# Patient Record
Sex: MALE | Race: WHITE | Employment: FULL TIME | URBAN - METROPOLITAN AREA
[De-identification: names, ages, dates, MRNs, and addresses within clinical notes are randomized per-mention and may not be internally consistent; named-entity substitution may affect disease eponyms.]

---

## 2018-06-06 VITALS
WEIGHT: 243 LBS | RESPIRATION RATE: 16 BRPM | SYSTOLIC BLOOD PRESSURE: 120 MMHG | DIASTOLIC BLOOD PRESSURE: 70 MMHG | BODY MASS INDEX: 32.2 KG/M2 | HEART RATE: 72 BPM | TEMPERATURE: 98.1 F | HEIGHT: 73 IN

## 2018-07-05 ENCOUNTER — TELEPHONE (OUTPATIENT)
Dept: PRIMARY CARE CLINIC | Age: 18
End: 2018-07-05

## 2018-07-05 NOTE — TELEPHONE ENCOUNTER
Chest cold, has not gone since late May, no appointments showing today; And CNP at [facility] for overflow is out this week.   Please assist patient

## 2019-02-19 LAB
A/G RATIO: 2 (ref 1.1–2.2)
ALBUMIN SERPL-MCNC: 4.9 G/DL (ref 3.4–5)
ALP BLD-CCNC: 46 U/L (ref 40–129)
ALT SERPL-CCNC: 24 U/L (ref 10–40)
ANION GAP SERPL CALCULATED.3IONS-SCNC: 12 MMOL/L (ref 3–16)
AST SERPL-CCNC: 17 U/L (ref 15–37)
BASOPHILS ABSOLUTE: 0 K/UL (ref 0–0.2)
BASOPHILS RELATIVE PERCENT: 0.7 %
BILIRUB SERPL-MCNC: 0.9 MG/DL (ref 0–1)
BUN BLDV-MCNC: 16 MG/DL (ref 7–20)
CALCIUM SERPL-MCNC: 9.9 MG/DL (ref 8.3–10.6)
CHLORIDE BLD-SCNC: 102 MMOL/L (ref 99–110)
CHOLESTEROL, FASTING: 142 MG/DL (ref 0–199)
CO2: 26 MMOL/L (ref 21–32)
CREAT SERPL-MCNC: 1 MG/DL (ref 0.9–1.3)
EOSINOPHILS ABSOLUTE: 0.1 K/UL (ref 0–0.6)
EOSINOPHILS RELATIVE PERCENT: 2.3 %
GFR AFRICAN AMERICAN: >60
GFR NON-AFRICAN AMERICAN: >60
GLOBULIN: 2.4 G/DL
GLUCOSE FASTING: 92 MG/DL (ref 70–99)
HCT VFR BLD CALC: 48.8 % (ref 40.5–52.5)
HDLC SERPL-MCNC: 64 MG/DL (ref 40–60)
HEMOGLOBIN: 16.5 G/DL (ref 13.5–17.5)
LDL CHOLESTEROL CALCULATED: 69 MG/DL
LYMPHOCYTES ABSOLUTE: 1.4 K/UL (ref 1–5.1)
LYMPHOCYTES RELATIVE PERCENT: 24.1 %
MCH RBC QN AUTO: 29 PG (ref 26–34)
MCHC RBC AUTO-ENTMCNC: 33.8 G/DL (ref 31–36)
MCV RBC AUTO: 85.6 FL (ref 80–100)
MONOCYTES ABSOLUTE: 0.4 K/UL (ref 0–1.3)
MONOCYTES RELATIVE PERCENT: 7.1 %
NEUTROPHILS ABSOLUTE: 3.7 K/UL (ref 1.7–7.7)
NEUTROPHILS RELATIVE PERCENT: 65.8 %
PDW BLD-RTO: 13.4 % (ref 12.4–15.4)
PLATELET # BLD: 171 K/UL (ref 135–450)
PMV BLD AUTO: 8.4 FL (ref 5–10.5)
POTASSIUM SERPL-SCNC: 4.5 MMOL/L (ref 3.5–5.1)
RBC # BLD: 5.69 M/UL (ref 4.2–5.9)
SODIUM BLD-SCNC: 140 MMOL/L (ref 136–145)
T4 FREE: 1.1 NG/DL (ref 0.9–1.8)
TOTAL PROTEIN: 7.3 G/DL (ref 6.4–8.2)
TRIGLYCERIDE, FASTING: 44 MG/DL (ref 0–150)
TSH SERPL DL<=0.05 MIU/L-ACNC: 0.99 UIU/ML (ref 0.27–4.2)
VLDLC SERPL CALC-MCNC: 9 MG/DL
WBC # BLD: 5.7 K/UL (ref 4–11)

## 2019-02-21 ENCOUNTER — OFFICE VISIT (OUTPATIENT)
Dept: PRIMARY CARE CLINIC | Age: 19
End: 2019-02-21
Payer: COMMERCIAL

## 2019-02-21 VITALS
TEMPERATURE: 98.3 F | HEIGHT: 72 IN | DIASTOLIC BLOOD PRESSURE: 74 MMHG | SYSTOLIC BLOOD PRESSURE: 118 MMHG | BODY MASS INDEX: 35.33 KG/M2 | WEIGHT: 260.8 LBS | OXYGEN SATURATION: 99 % | HEART RATE: 83 BPM

## 2019-02-21 DIAGNOSIS — E66.09 CLASS 2 OBESITY DUE TO EXCESS CALORIES WITHOUT SERIOUS COMORBIDITY WITH BODY MASS INDEX (BMI) OF 35.0 TO 35.9 IN ADULT: ICD-10-CM

## 2019-02-21 DIAGNOSIS — Z00.00 ROUTINE GENERAL MEDICAL EXAMINATION AT A HEALTH CARE FACILITY: Primary | ICD-10-CM

## 2019-02-21 LAB
BILIRUBIN, POC: NORMAL
BLOOD URINE, POC: NORMAL
CLARITY, POC: CLEAR
COLOR, POC: YELLOW
GLUCOSE URINE, POC: NORMAL
KETONES, POC: NORMAL
LEUKOCYTE EST, POC: NORMAL
NITRITE, POC: NORMAL
PH, POC: 7
PROTEIN, POC: NORMAL
SPECIFIC GRAVITY, POC: 1.01
UROBILINOGEN, POC: NORMAL

## 2019-02-21 PROCEDURE — G8484 FLU IMMUNIZE NO ADMIN: HCPCS | Performed by: NURSE PRACTITIONER

## 2019-02-21 PROCEDURE — 81002 URINALYSIS NONAUTO W/O SCOPE: CPT | Performed by: NURSE PRACTITIONER

## 2019-02-21 PROCEDURE — 92551 PURE TONE HEARING TEST AIR: CPT | Performed by: NURSE PRACTITIONER

## 2019-02-21 PROCEDURE — 99395 PREV VISIT EST AGE 18-39: CPT | Performed by: NURSE PRACTITIONER

## 2019-02-21 ASSESSMENT — ENCOUNTER SYMPTOMS
CHEST TIGHTNESS: 0
SHORTNESS OF BREATH: 0
NAUSEA: 0
VOMITING: 0
COUGH: 0
TROUBLE SWALLOWING: 0
DIARRHEA: 0
ABDOMINAL PAIN: 0
CONSTIPATION: 0

## 2019-02-21 ASSESSMENT — VISUAL ACUITY
OD_CC: 20/20
OS_CC: 20/20

## 2022-01-03 ENCOUNTER — OFFICE VISIT (OUTPATIENT)
Dept: ENT CLINIC | Age: 22
End: 2022-01-03
Payer: COMMERCIAL

## 2022-01-03 VITALS
HEIGHT: 72 IN | DIASTOLIC BLOOD PRESSURE: 84 MMHG | TEMPERATURE: 97.5 F | WEIGHT: 229 LBS | BODY MASS INDEX: 31.02 KG/M2 | SYSTOLIC BLOOD PRESSURE: 122 MMHG

## 2022-01-03 DIAGNOSIS — R09.81 NASAL CONGESTION: Primary | ICD-10-CM

## 2022-01-03 DIAGNOSIS — H93.8X3 EAR FULLNESS, BILATERAL: ICD-10-CM

## 2022-01-03 DIAGNOSIS — J34.2 NASAL SEPTAL DEVIATION: ICD-10-CM

## 2022-01-03 DIAGNOSIS — F42.9 OBSESSIVE-COMPULSIVE DISORDER, UNSPECIFIED TYPE: ICD-10-CM

## 2022-01-03 PROCEDURE — 99203 OFFICE O/P NEW LOW 30 MIN: CPT | Performed by: OTOLARYNGOLOGY

## 2022-01-03 RX ORDER — SODIUM CHLORIDE/SODIUM BICARB
1 PACKET (EA) NASAL 2 TIMES DAILY
Qty: 200 EACH | Refills: 1 | Status: SHIPPED | OUTPATIENT
Start: 2022-01-03

## 2022-01-03 RX ORDER — FLUTICASONE PROPIONATE 50 MCG
1 SPRAY, SUSPENSION (ML) NASAL 2 TIMES DAILY
Qty: 16 G | Refills: 2 | Status: SHIPPED | OUTPATIENT
Start: 2022-01-03

## 2022-01-03 RX ORDER — HYDROXYZINE HYDROCHLORIDE 10 MG/1
10 TABLET, FILM COATED ORAL
COMMUNITY
Start: 2021-11-26

## 2022-01-03 NOTE — PROGRESS NOTES
[facility] PATIENT CONSULT NOTE      Patient Name: Geneva Johnson  Medical Record Number:  [id number]  Primary Care Physician:  None Provider    Chief Concern     Chief Complaint   Patient presents with    New Patient     Patient states his ears \"feel cogged\", and he feels off balanced     Assessment and Plan      Diagnosis Orders   1. Nasal congestion  Hypertonic Nasal Wash (SINUS RINSE REFILL) PACK    fluticasone (FLONASE) 50 MCG/ACT nasal spray   2. Ear fullness, bilateral     3. Nasal septal deviation     4. Obsessive-compulsive disorder, unspecified type       19year old male with nasal fullness, ear pressure/hearing loss and slight dysequilibrium (states vertigo, no nystagmus appreciated) - type A tympanograms bilaterally with good lateralization of the eardrums on valsalva, normal tuning fork examination. Nose with moderate inflammation and left mid-septal deviation. Will start flonase/saline rinses, recommend Valsalva, RTC in 6 weeks. Return in about 6 weeks (around 2/14/2022). History of Present Illness     [facility] Wendy Machado is an 19 y.o. male with concern for hearing loss, imbalance and clogged ears - ongoing since late 11/2021 after an upper respiratory infection. Was diagnosed with an ear infection, given Augmentin - states significant pain in the ears at that time which resolved. Feels like \"my head's in the clouds\". Some \"very small\" farhat vertigo/room spinning, constant without nausea/emesis. 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 No history of ear surgery. Nonsmoker, no family history of hearing loss. Concern for increased anxiety/panic attacks - has OCD sees Dr. Karsten Fitzpatrick and [facility] in [place], [place] - has discussed this with her.      Past Medical History     Past Medical History:   Diagnosis Date    Depression     Hearing loss     Obesity        Past Surgical History No past surgical history on file. Family History     Family History   Problem Relation Age of Onset    Colon Cancer Maternal Grandmother     Colon Cancer Maternal Grandfather     Colon Cancer Paternal Grandmother     Colon Cancer Paternal Grandfather     Diabetes Paternal Grandfather        Social History     Social History     Tobacco Use    Smoking status: Never Smoker    Smokeless tobacco: Never Used   Substance Use Topics    Alcohol use: No    Drug use: No        Allergies     Allergies   Allergen Reactions    Robitussin Multi-Symptom Max [Phenylephrine-Dm-Gg] Anaphylaxis       Medications     Current Outpatient Medications   Medication Sig Dispense Refill    hydrOXYzine (ATARAX) 10 MG tablet Take 10 mg by mouth      Hypertonic Nasal Wash (SINUS RINSE REFILL) PACK 1 packet by Nasal route 2 times daily 200 each 1    fluticasone (FLONASE) 50 MCG/ACT nasal spray 1 spray by Each Nostril route 2 times daily 16 g 2     No current facility-administered medications for this visit.        Review of Systems     REVIEW OF SYSTEMS  The following systems were reviewed and revealed the following in addition to any already discussed in the HPI:    CONSTITUTIONAL: NO weight loss, no fever, no night sweats, no chills  EYES: no vision changes, no blurry vision  EARS: +changes in hearing, no otalgia  NOSE: no epistaxis, no rhinorrhea  RESPIRATORY: NO difficulty breathing, no shortness of breath  CV: no chest pain, NO peripheral vascular disease  HEME: No coagulation disorder, NO bleeding disorder  NEURO: No TIA or stroke-like symptoms  SKIN: No new rashes in the head and neck, no recent skin cancers  MOUTH: No new ulcers, no recent teeth infections  GASTROINTESTINAL: No diarrhea, stomach pain  PSYCH: +anxiety, no depression    PhysicalExam     Vitals:    01/03/22 0926   BP: 122/84   Site: Left Upper Arm   Position: Sitting   Temp: 97.5 °F (36.4 °C)   Weight: 229 lb (103.9 kg)   Height: 6' (1.829 m) PHYSICAL EXAM  /84 (Site: Left Upper Arm, Position: Sitting)   Temp 97.5 °F (36.4 °C)   Ht 6' (1.829 m)   Wt 229 lb (103.9 kg)   BMI 31.06 kg/m²     GENERAL: No Acute Distress, Alert and Oriented, No hoarseness  EYES: EOMI, Anti-icteric  NOSE: On anterior rhinoscopy there is no epistaxis, nasal mucosa within normal limits, no purulent drainage  EARS: Normal external appearance; on portable otomicroscopy:  -Right ear: External auditory canal without stenosis, tympanic membrane clear, no middle ear effusions or retractions  -Left ear: External auditory canal without stenosis, tympanic membrane clear, no middle ear effusions or retractions  -Tuning fork testing: With a 512-Hz tuning fork the Leija is midline, The Rinne on the right ear the is air>bone conduction, on the left ear air>bone conduction  -Portable tympanometry with type A bilaterally (-28/-35dKpa AD/AS)  FACE: 1/6 House-Brackmann Scale, symmetric, sensation equal bilaterally  ORAL CAVITY: No masses or lesions palpated, uvula is midline, moist mucous membranes, 1+ tonsils, good dentition  NECK: Normal range of motion, no thyromegaly, trachea is midline, no lymphadenopathy, no neck masses, no crepitus  CHEST: Normal respiratory effort, no retractions, breathing comfortably  SKIN: No rashes, normal appearing skin, no evidence of skin lesions/tumors  NEURO: CN 2, 3, 4, 5, 6, 7, 11, 12 intact bilaterally     Data Review       Procedure     None    Taran Levy MD  [facility]  Department of Otolaryngology/Head and Neck Surgery  1/3/22    I have performed a head and neck physical exam personally or was physically present during the key or critical portions of the service. This note was generated completely or in part utilizing Dragon dictation speech recognition software. Occasionally, words are mistranscribed and despite editing, the text may contain inaccuracies due to incorrect word recognition.   If further clarification is needed please contact the office at [phone]. An electronic signature was used to authenticate this note.